# Patient Record
Sex: MALE | Race: WHITE | NOT HISPANIC OR LATINO | Employment: FULL TIME | URBAN - METROPOLITAN AREA
[De-identification: names, ages, dates, MRNs, and addresses within clinical notes are randomized per-mention and may not be internally consistent; named-entity substitution may affect disease eponyms.]

---

## 2021-04-06 DIAGNOSIS — Z23 ENCOUNTER FOR IMMUNIZATION: ICD-10-CM

## 2024-09-13 ENCOUNTER — EVALUATION (OUTPATIENT)
Dept: PHYSICAL THERAPY | Facility: REHABILITATION | Age: 54
End: 2024-09-13
Payer: COMMERCIAL

## 2024-09-13 DIAGNOSIS — M54.50 ACUTE RIGHT-SIDED LOW BACK PAIN WITHOUT SCIATICA: Primary | ICD-10-CM

## 2024-09-13 PROCEDURE — 97161 PT EVAL LOW COMPLEX 20 MIN: CPT

## 2024-09-13 PROCEDURE — 97140 MANUAL THERAPY 1/> REGIONS: CPT

## 2024-09-13 PROCEDURE — 97110 THERAPEUTIC EXERCISES: CPT

## 2024-09-13 NOTE — LETTER
2024    Luther Estes DO  [address] [place] [address]  [facility] [id number]    Patient: Neal Ly   YOB: 1970   Date of Visit: 2024     Encounter Diagnosis     ICD-10-CM    1. Acute right-sided low back pain without sciatica  M54.50           Dear Dr. Estes:    Thank you for your recent referral of Neal Ly. Please review the attached evaluation summary from Neal's recent visit.     Please verify that you agree with the plan of care by signing the attached order.     If you have any questions or concerns, please do not hesitate to call.     I sincerely appreciate the opportunity to share in the care of one of your patients and hope to have another opportunity to work with you in the near future.       Sincerely,    Padmini Walker, PT      Referring Provider:      I certify that I have read the below Plan of Care and certify the need for these services furnished under this plan of treatment while under my care.                    Luther Estes DO  [address] [place] [address]  [facility] [address]  Via Fax: [phone]          PT Evaluation     Today's date: 2024  Patient name: Neal Ly  : 1970  MRN: [id number]  Referring provider: Luther Estes DO  Dx: No diagnosis found.               Assessment    Assessment details: Pt is a 54 y.o. male who presents to OP PT for IE following referral for acute R sided LBP w/o sciatica. Pt c/o R sided back pain w/ rotation motions and w/ lifting. Upon formal assessment pt demonstrates mild thoracic rotation and extension and lumbar extension ROM deficits. B HS, hip flexor, and quadriceps flexibility tw/ significant restrictions. Given these findings pt is recommended for skilled PT intervention 1x wk with focus on developing stretching program to improve level of function, to increase overall quality of life, and to address aforementioned impairments. Pt agreeable to POC. HEP given and completed in clinic. HEP to be  "progressed weekly as pt has goals of developing comprehensive stretching program to continue w/ at d/c.     Understanding of Dx/Px/POC: good     Prognosis: good    Goals  STGs (to be achieved within 2-4 weeks)  1. Pt will report no >0/10 pain w/ all activities to indicate a significant improvement in activity tolerance and pain.  2. Pt will improve thoracic ROM to WNL and pain-free to increase ease w/ functional mobility.     LTGs (to be achieved within 6-8 weeks)   1. Pt will be I with HEP at d/c to promote PT carry-over.   2. Pt will meet FOTO predicted score.   3. Pt will improve B HS, hip flexor, and quadriceps flexibility to help decrease pain and increase ease w/ functional mobility.  4. Pt will improve core strength to 4/5 or greater to increase ease w/ functional mobility.    Pt goals include \"learn enough to be able to manage this on my own and start some stretching and strengthening in addition to the bike\".          Plan  Planned modality interventions: unattended electrical stimulation, ultrasound, traction, thermotherapy: hydrocollator packs, low level laser therapy and cryotherapy    Planned therapy interventions: therapeutic exercise, therapeutic activities, gait training, neuromuscular re-education and manual therapy    Frequency: 1-2x week.  Duration in weeks: 8  Plan of Care beginning date: 9/13/2024  Plan of Care expiration date: 11/8/2024  Treatment plan discussed with: patient      Subjective Evaluation    History of Present Illness  Mechanism of injury: Pt c/o LBP that started about 2 weeks ago when he just woke up with back pain. Saw Dr. Estes who dx pt w/ strain and was given muscle relaxer which have helped. X-ray of lumbar spine revealed mild degenerative changes at T12-L1, L1-L2, L2-L3, L3-L4 and 1 mm anterolisthesis of L4 on L5 in the neutral position. X-ray of thoracic spine revealed diffuse thoracic spine hypertrophic degenerative spurring with multilevel bridging osteophytes. Pt had " "concerns for kidney stones prior getting x-rays.     Pt rates pain as 7 1/2-8/10 W, 3/10 B/C.   Denies radicular symptoms.   Aggravating factors include: prolonged sitting, prolonged standing, lifting   Alleviating factors include: walking, taking rx medications  Functionally pt is having greatest difficulty with riding stationary bike. lifting things and mowing lawn on riding mower. Likes to golf. Pt works as teacher in [employer].     Pt goals include \"learn enough to be able to manage this on my own and start some stretching and strengthening in addition to the bike\".          Objective     Concurrent Complaints  Negative for night pain, bladder dysfunction, bowel dysfunction and saddle (S4) numbness    Postural Observations  Seated posture: fair  Standing posture: fair    Additional Postural Observation Details  Forward head, rounded shoulders    Neurological Testing     Sensation     Lumbar   Left   Intact: light touch    Right   Intact: light touch    Active Range of Motion   Cervical/Thoracic Spine       Thoracic    Flexion:  WFL  Extension:  WFL  Left rotation:  Restriction level: minimal  Right rotation:  WFL and with pain    Lumbar   Extension: 5 degrees   Left lateral flexion:  WFL  Right lateral flexion:  WFL  Left rotation:  WFL  Right rotation:  WFL    Tests     Lumbar     Left   Negative crossed SLR and passive SLR.     Right   Negative crossed SLR and passive SLR.     Left Hip   Negative COLEMAN and FADIR.   Modified Jon: Positive.     Right Hip   Negative COLEMAN and FADIR.   Modified Jon: Positive.     Additional Tests Details        Hamstring flexibility (passive hip flexion measure):      L: 63 degrees     R: 60 degrees    Quad flexibility (prone knee flexion measure):      L: 55 degrees     R: 57 degrees              Precautions: hyperlipidemia     * indicates included in HEP:   Access Code: [email or web address]  URL: [email or web address]  Date: 09/13/2024  Prepared by: Padmini " "Rappold    Manuals 9/13            T/L PA mobs (Gr III-IV) VR 4'            STM B thoracolumbar muscles 8'            Passive piriformis stretch B/l 3x30\" krunal                         Neuro Re-Ed             Bird dog             Paloff press             PB core stabilization                                                                 Ther Ex             Pt education POC, HEP            TM             Supine HS stretch* 3x30\" ea            LTR* 10x10\" ea            Hip flexor stretch EOM             Piriformis stretch             Open book stretch* 10x10\" krunal            Prone quad stretch* 3x30\"            Malloryipeshelby thread the needle             Pec stretch             Calf stretch                          Ther Activity                                       Gait Training                                       Modalities                                                            "

## 2024-09-13 NOTE — PROGRESS NOTES
"PT Evaluation     Today's date: 2024  Patient name: Neal Ly  : 1970  MRN: [id number]  Referring provider: Luther Estes DO  Dx: No diagnosis found.               Assessment    Assessment details: Pt is a 54 y.o. male who presents to OP PT for IE following referral for acute R sided LBP w/o sciatica. Pt c/o R sided back pain w/ rotation motions and w/ lifting. Upon formal assessment pt demonstrates mild thoracic rotation and extension and lumbar extension ROM deficits. B HS, hip flexor, and quadriceps flexibility tw/ significant restrictions. Given these findings pt is recommended for skilled PT intervention 1x wk with focus on developing stretching program to improve level of function, to increase overall quality of life, and to address aforementioned impairments. Pt agreeable to POC. HEP given and completed in clinic. HEP to be progressed weekly as pt has goals of developing comprehensive stretching program to continue w/ at d/c.     Understanding of Dx/Px/POC: good     Prognosis: good    Goals  STGs (to be achieved within 2-4 weeks)  1. Pt will report no >0/10 pain w/ all activities to indicate a significant improvement in activity tolerance and pain.  2. Pt will improve thoracic ROM to WNL and pain-free to increase ease w/ functional mobility.     LTGs (to be achieved within 6-8 weeks)   1. Pt will be I with HEP at d/c to promote PT carry-over.   2. Pt will meet FOTO predicted score.   3. Pt will improve B HS, hip flexor, and quadriceps flexibility to help decrease pain and increase ease w/ functional mobility.  4. Pt will improve core strength to 4/5 or greater to increase ease w/ functional mobility.    Pt goals include \"learn enough to be able to manage this on my own and start some stretching and strengthening in addition to the bike\".          Plan  Planned modality interventions: unattended electrical stimulation, ultrasound, traction, thermotherapy: hydrocollator packs, low level laser " "therapy and cryotherapy    Planned therapy interventions: therapeutic exercise, therapeutic activities, gait training, neuromuscular re-education and manual therapy    Frequency: 1-2x week.  Duration in weeks: 8  Plan of Care beginning date: 9/13/2024  Plan of Care expiration date: 11/8/2024  Treatment plan discussed with: patient      Subjective Evaluation    History of Present Illness  Mechanism of injury: Pt c/o LBP that started about 2 weeks ago when he just woke up with back pain. Saw Dr. Estes who dx pt w/ strain and was given muscle relaxer which have helped. X-ray of lumbar spine revealed mild degenerative changes at T12-L1, L1-L2, L2-L3, L3-L4 and 1 mm anterolisthesis of L4 on L5 in the neutral position. X-ray of thoracic spine revealed diffuse thoracic spine hypertrophic degenerative spurring with multilevel bridging osteophytes. Pt had concerns for kidney stones prior getting x-rays.     Pt rates pain as 7 1/2-8/10 W, 3/10 B/C.   Denies radicular symptoms.   Aggravating factors include: prolonged sitting, prolonged standing, lifting   Alleviating factors include: walking, taking rx medications  Functionally pt is having greatest difficulty with riding stationary bike. lifting things and mowing lawn on riding mower. Likes to golf. Pt works as teacher in [employer].     Pt goals include \"learn enough to be able to manage this on my own and start some stretching and strengthening in addition to the bike\".          Objective     Concurrent Complaints  Negative for night pain, bladder dysfunction, bowel dysfunction and saddle (S4) numbness    Postural Observations  Seated posture: fair  Standing posture: fair    Additional Postural Observation Details  Forward head, rounded shoulders    Neurological Testing     Sensation     Lumbar   Left   Intact: light touch    Right   Intact: light touch    Active Range of Motion   Cervical/Thoracic Spine       Thoracic    Flexion:  WFL  Extension:  WFL  Left " "rotation:  Restriction level: minimal  Right rotation:  WFL and with pain    Lumbar   Extension: 5 degrees   Left lateral flexion:  WFL  Right lateral flexion:  WFL  Left rotation:  WFL  Right rotation:  WFL    Tests     Lumbar     Left   Negative crossed SLR and passive SLR.     Right   Negative crossed SLR and passive SLR.     Left Hip   Negative COLEMAN and FADIR.   Modified Jon: Positive.     Right Hip   Negative COLEMAN and FADIR.   Modified Jon: Positive.     Additional Tests Details        Hamstring flexibility (passive hip flexion measure):      L: 63 degrees     R: 60 degrees    Quad flexibility (prone knee flexion measure):      L: 55 degrees     R: 57 degrees              Precautions: hyperlipidemia     * indicates included in HEP:   Access Code: [email or web address]  URL: [email or web address]  Date: 09/13/2024  Prepared by: Padmini Walker    Manuals 9/13            T/L NINO payan (Gr III-IV) VR 4'            STM B thoracolumbar muscles 8'            Passive piriformis stretch B/l 3x30\" ea                         Neuro Re-Ed             Bird dog             Paloff press             PB core stabilization                                                                 Ther Ex             Pt education POC, HEP            TM             Supine HS stretch* 3x30\" ea            LTR* 10x10\" ea            Hip flexor stretch EOM             Piriformis stretch             Open book stretch* 10x10\" ea            Prone quad stretch* 3x30\"            Quadriped thread the needle             Pec stretch             Calf stretch                          Ther Activity                                       Gait Training                                       Modalities                                            "

## 2024-09-19 ENCOUNTER — OFFICE VISIT (OUTPATIENT)
Dept: PHYSICAL THERAPY | Facility: REHABILITATION | Age: 54
End: 2024-09-19
Payer: COMMERCIAL

## 2024-09-19 DIAGNOSIS — M54.50 ACUTE RIGHT-SIDED LOW BACK PAIN WITHOUT SCIATICA: Primary | ICD-10-CM

## 2024-09-19 PROCEDURE — 97112 NEUROMUSCULAR REEDUCATION: CPT

## 2024-09-19 PROCEDURE — 97140 MANUAL THERAPY 1/> REGIONS: CPT

## 2024-09-19 NOTE — PROGRESS NOTES
"Daily Note     Today's date: 2024  Patient name: Neal Ly  : 1970  MRN: [id number]  Referring provider: Luther Estes DO  Dx:   Encounter Diagnosis     ICD-10-CM    1. Acute right-sided low back pain without sciatica  M54.50           Start Time: 0415  Stop Time: 0500  Total time in clinic (min): 45 minutes    Subjective: patient states that he had some serious cramping the other night so this kept him from continuing with the HEP that day.  HS was the area that was cramping. Otherwise no new issues with the back and things seem to be improving overall and has been able to bend a bit more at work      Objective: See treatment diary below      Assessment: Tolerated treatment well. Patient demonstrated fatigue post treatment, exhibited good technique with therapeutic exercises, and would benefit from continued PT.  Patient capable of initiating core/hip strengthening with appropriate symptom response.  Interventions reviewed in clinic provided to HEP and patient educated regarding proper dosage/form/response.  Plan to progress as symptoms allow.      Plan: Continue per plan of care.      Precautions: hyperlipidemia     * indicates included in HEP:   Access Code: [email or web address]  URL: [email or web address]  Date: 2024  Prepared by: Padmini Walker    Manuals            T/L NINO payan (Gr III-IV) VR 4' MT 5min           STM B thoracolumbar muscles 8' 10min           Passive piriformis stretch B/l 3x30\" ea                         Neuro Re-Ed             Bird dog             Paloff press             PB core stabilization  5\"x10           Dead Bug ISO  2x20\"           SL Hip ABD  2x10           Chair Squat + Hinge Cues  2x10                        Ther Ex             Pt education POC, HEP            TM             Supine HS stretch* 3x30\" ea            LTR* 10x10\" ea            Hip flexor stretch EOM             Piriformis stretch             Open book stretch* 10x10\" ea 10x10\" ea       " "    Prone quad stretch* 3x30\"            Quadriped thread the needle             Pec stretch             Calf stretch                          Ther Activity                                       Gait Training                                       Modalities                                            "

## 2024-09-26 ENCOUNTER — OFFICE VISIT (OUTPATIENT)
Dept: PHYSICAL THERAPY | Facility: REHABILITATION | Age: 54
End: 2024-09-26
Payer: COMMERCIAL

## 2024-09-26 DIAGNOSIS — M54.50 ACUTE RIGHT-SIDED LOW BACK PAIN WITHOUT SCIATICA: Primary | ICD-10-CM

## 2024-09-26 PROCEDURE — 97112 NEUROMUSCULAR REEDUCATION: CPT

## 2024-09-26 PROCEDURE — 97140 MANUAL THERAPY 1/> REGIONS: CPT

## 2024-09-26 NOTE — PROGRESS NOTES
"Daily Note     Today's date: 2024  Patient name: Neal Ly  : 1970  MRN: [id number]  Referring provider: Luther Estes DO  Dx:   Encounter Diagnosis     ICD-10-CM    1. Acute right-sided low back pain without sciatica  M54.50           Start Time: 033  Stop Time: 415  Total time in clinic (min): 45 minutes    Subjective: Patient notes that he had a full day of standing and hte back is stiff as a result.  Has been doing well with HEP and is getting better with the dead bugs.  Notes that he has been more aware of form when squatting to pick things up throughout the day at work and this has been helping.       Objective: See treatment diary below      Assessment: Tolerated treatment well. Patient demonstrated fatigue post treatment, exhibited good technique with therapeutic exercises, and would benefit from continued PT.  Patient capable of progressing core/hip strengthening with appropriate symptom response.  Waddle walks provided to HEP to alternate days with SL hip abduction which is progressed to include band resistance today.  Provided progressed dead bug as well.  Mod difficulty maintaining neutral spine with addition of bird dog though improved with tactile/verbal cueing. Plan to continue to progress as symptoms allow.      Plan: Continue per plan of care.      Precautions: hyperlipidemia     * indicates included in HEP:   Access Code: [email or web address]  URL: [email or web address]  Date: 2024  Prepared by: Padmini Walker    Manuals           T/L PA mobs (Gr III-IV) VR 4' MT 5min MT 5min          STM B thoracolumbar muscles 8' 10min 10min          Passive piriformis stretch B/l 3x30\" ea                         Neuro Re-Ed             Bird dog   3x5ea          Paloff press             PB core stabilization  5\"x10           Dead Bug ISO  2x20\" 3x5 (+alt heel tap)          SL Hip ABD  2x10 (+RTB)          Chair Squat + Hinge Cues  2x10           Waddle Walk vs TB   4 Laps " "(RTB)          [place] Carry   2 laps (#8s)          Ther Ex             Pt education POC, HEP            TM             Supine HS stretch* 3x30\" ea            LTR* 10x10\" ea            Hip flexor stretch EOM             Piriformis stretch             Open book stretch* 10x10\" ea 10x10\" ea           Prone quad stretch* 3x30\"            Quadriped thread the needle             Pec stretch   3x30\"          Calf stretch                          Ther Activity                                       Gait Training                                       Modalities                                            "

## 2024-10-03 ENCOUNTER — OFFICE VISIT (OUTPATIENT)
Dept: PHYSICAL THERAPY | Facility: REHABILITATION | Age: 54
End: 2024-10-03
Payer: COMMERCIAL

## 2024-10-03 DIAGNOSIS — M54.50 ACUTE RIGHT-SIDED LOW BACK PAIN WITHOUT SCIATICA: Primary | ICD-10-CM

## 2024-10-03 PROCEDURE — 97112 NEUROMUSCULAR REEDUCATION: CPT | Performed by: PHYSICAL THERAPIST

## 2024-10-03 PROCEDURE — 97140 MANUAL THERAPY 1/> REGIONS: CPT | Performed by: PHYSICAL THERAPIST

## 2024-10-03 NOTE — PROGRESS NOTES
"Daily Note     Today's date: 10/3/2024  Patient name: Neal Ly  : 1970  MRN: [id number]  Referring provider: Luther Estes DO  Dx:   Encounter Diagnosis     ICD-10-CM    1. Acute right-sided low back pain without sciatica  M54.50                      Subjective: patient mentions he cleaned his pool filters and had increased pain which caused him to be really frustrated. He mentions that he feels good in PT and when he does the exercises at home but gets frustrated when he tries to do other things and still has pain. However, he does note he no longer gets the sharp pain he was having previously.      Objective: See treatment diary below      Assessment: Tolerated treatment well. Continued with program as outlined below. Discussed that this is only the 3rd visit after the evaluation and improvement will take time. Also noted the positives that he is no longer having the sharp pain as an improvement. He demonstrates understanding. No reports of increased symptoms noted throughout the session. Patient demonstrated fatigue post treatment, exhibited good technique with therapeutic exercises, and would benefit from continued PT      Plan: Continue per plan of care.      Precautions: hyperlipidemia     * indicates included in HEP:   Access Code: [email or web address]  URL: [email or web address]  Date: 2024  Prepared by: Padmini Walker    Manuals 9/13 9/19 9/26 10/3         T/L NINO payan (Gr III-IV) VR 4' MT 5min MT 5min SK  5 min         STM B thoracolumbar muscles 8' 10min 10min 10 min         Passive piriformis stretch B/l 3x30\" ea                         Neuro Re-Ed             Bird dog   3x5ea          Paloff press             PB core stabilization  5\"x10           Dead Bug ISO  2x20\" 3x5 (+alt heel tap) 3x5 (+alt heel tap)         SL Hip ABD  2x10 (+RTB)          Chair Squat + Hinge Cues  2x10           Waddle Walk vs TB   4 Laps (RTB) 4 laps (RTB)         [place] Carry   2 laps (#8s) 3 laps (#8s)   " "      Ther Ex             Pt education POC, HEP            TM             Supine HS stretch* 3x30\" ea            LTR* 10x10\" ea            Hip flexor stretch EOM             Piriformis stretch             Open book stretch* 10x10\" ea 10x10\" ea  10 x10\" ea         Prone quad stretch* 3x30\"            Quadriped thread the needle             Pec stretch   3x30\"          Calf stretch                                       Pt education    SK                      Ther Activity                                       Gait Training                                       Modalities                                              "

## 2024-10-10 ENCOUNTER — OFFICE VISIT (OUTPATIENT)
Dept: PHYSICAL THERAPY | Facility: REHABILITATION | Age: 54
End: 2024-10-10
Payer: COMMERCIAL

## 2024-10-10 DIAGNOSIS — M54.50 ACUTE RIGHT-SIDED LOW BACK PAIN WITHOUT SCIATICA: Primary | ICD-10-CM

## 2024-10-10 PROCEDURE — 97112 NEUROMUSCULAR REEDUCATION: CPT

## 2024-10-10 PROCEDURE — 97140 MANUAL THERAPY 1/> REGIONS: CPT

## 2024-10-10 PROCEDURE — 97110 THERAPEUTIC EXERCISES: CPT

## 2024-10-10 NOTE — PROGRESS NOTES
"Daily Note     Today's date: 10/10/2024  Patient name: Neal Ly  : 1970  MRN: [id number]  Referring provider: Luther Estes DO  Dx:   Encounter Diagnosis     ICD-10-CM    1. Acute right-sided low back pain without sciatica  M54.50                      Subjective: Patient reports feeling good after PT, return to baseline pain by the time of his next PT appt- still without sharp LBP.     Tasks/activities with a lot of FF are most irritating for him. Standing trunk rotations/extension are some of things he does to manage his symptoms.      He hasn't been doing much HS and quad stretching at home.       Objective: See treatment diary below      Assessment: Tolerated treatment well. Fair core engagement with trunk stability tasks. Modified dead bugs to complete EOT with limited UE/LE arc to facilitate neutral spine/better form. Added quadriped thread the needle to promote T/s and L/s mobility.  Continued PT would be beneficial to improve function.          Plan: Continue per plan of care.       Precautions: hyperlipidemia     * indicates included in HEP:   Access Code: [email or web address]  URL: [email or web address]  Date: 2024  Prepared by: Padmini Walker    Manuals 9/13 9/19 9/26 10/3 10/10        T/L PA mobs (Gr III-IV) VR 4' MT 5min MT 5min SK  5 min         STM B thoracolumbar muscles 8' 10min 10min 10 min 10 min        Passive piriformis stretch B/l 3x30\" ea                         Neuro Re-Ed             Bird dog   3x5ea  3x5 ea EOT modfied        Paloff press             PB core stabilization  5\"x10           Dead Bug ISO  2x20\" 3x5 (+alt heel tap) 3x5 (+alt heel tap) 3x5 (+alt heel tap)        SL Hip ABD  2x10 (+RTB)          Chair Squat + Hinge Cues  2x10           Waddle Walk vs TB   4 Laps (RTB) 4 laps (RTB) 4 laps (RTB)        [place] Carry   2 laps (#8s) 3 laps (#8s) 3 laps (#8s)        Ther Ex             Pt education POC, HEP            TM             Supine HS stretch* 3x30\" ea     " "       LTR* 10x10\" ea            Hip flexor stretch EOM             Piriformis stretch             Open book stretch* 10x10\" ea 10x10\" ea  10 x10\" ea 10 x10\" ea        Prone quad stretch* 3x30\"            Quadriped thread the needle     5x ea         Pec stretch   3x30\"          Calf stretch                                       Pt education    SK                      Ther Activity                                       Gait Training                                       Modalities                                                "